# Patient Record
Sex: MALE | Race: BLACK OR AFRICAN AMERICAN
[De-identification: names, ages, dates, MRNs, and addresses within clinical notes are randomized per-mention and may not be internally consistent; named-entity substitution may affect disease eponyms.]

---

## 2019-08-11 ENCOUNTER — HOSPITAL ENCOUNTER (EMERGENCY)
Dept: HOSPITAL 74 - JER | Age: 34
Discharge: HOME | End: 2019-08-11
Payer: COMMERCIAL

## 2019-08-11 VITALS — HEART RATE: 78 BPM | DIASTOLIC BLOOD PRESSURE: 78 MMHG | SYSTOLIC BLOOD PRESSURE: 127 MMHG

## 2019-08-11 VITALS — TEMPERATURE: 99.6 F

## 2019-08-11 VITALS — BODY MASS INDEX: 27.7 KG/M2

## 2019-08-11 DIAGNOSIS — J36: Primary | ICD-10-CM

## 2019-08-11 LAB
ALBUMIN SERPL-MCNC: 4.1 G/DL (ref 3.4–5)
ALP SERPL-CCNC: 82 U/L (ref 45–117)
ALT SERPL-CCNC: 22 U/L (ref 13–61)
ANION GAP SERPL CALC-SCNC: 6 MMOL/L (ref 8–16)
AST SERPL-CCNC: 17 U/L (ref 15–37)
BASOPHILS # BLD: 0.5 % (ref 0–2)
BILIRUB SERPL-MCNC: 0.7 MG/DL (ref 0.2–1)
BUN SERPL-MCNC: 16.5 MG/DL (ref 7–18)
CALCIUM SERPL-MCNC: 9.3 MG/DL (ref 8.5–10.1)
CHLORIDE SERPL-SCNC: 99 MMOL/L (ref 98–107)
CO2 SERPL-SCNC: 31 MMOL/L (ref 21–32)
CREAT SERPL-MCNC: 1.2 MG/DL (ref 0.55–1.3)
DEPRECATED RDW RBC AUTO: 11.9 % (ref 11.9–15.9)
EOSINOPHIL # BLD: 0.7 % (ref 0–4.5)
GLUCOSE SERPL-MCNC: 94 MG/DL (ref 74–106)
HCT VFR BLD CALC: 43.2 % (ref 35.4–49)
HGB BLD-MCNC: 13.9 GM/DL (ref 11.7–16.9)
INR BLD: 1.16 (ref 0.83–1.09)
LYMPHOCYTES # BLD: 12 % (ref 8–40)
MCH RBC QN AUTO: 29.6 PG (ref 25.7–33.7)
MCHC RBC AUTO-ENTMCNC: 32.2 G/DL (ref 32–35.9)
MCV RBC: 91.9 FL (ref 80–96)
MONOCYTES # BLD AUTO: 9.5 % (ref 3.8–10.2)
NEUTROPHILS # BLD: 77.3 % (ref 42.8–82.8)
PLATELET # BLD AUTO: 302 K/MM3 (ref 134–434)
PMV BLD: 7.9 FL (ref 7.5–11.1)
POTASSIUM SERPLBLD-SCNC: 4.1 MMOL/L (ref 3.5–5.1)
PROT SERPL-MCNC: 8.6 G/DL (ref 6.4–8.2)
PT PNL PPP: 13.7 SEC (ref 9.7–13)
RBC # BLD AUTO: 4.7 M/MM3 (ref 4–5.6)
SODIUM SERPL-SCNC: 136 MMOL/L (ref 136–145)
WBC # BLD AUTO: 14.8 K/MM3 (ref 4–10)

## 2019-08-11 PROCEDURE — 0C9P0ZZ DRAINAGE OF TONSILS, OPEN APPROACH: ICD-10-PCS

## 2019-08-11 PROCEDURE — 3E0337Z INTRODUCTION OF ELECTROLYTIC AND WATER BALANCE SUBSTANCE INTO PERIPHERAL VEIN, PERCUTANEOUS APPROACH: ICD-10-PCS

## 2019-08-11 PROCEDURE — 3E03329 INTRODUCTION OF OTHER ANTI-INFECTIVE INTO PERIPHERAL VEIN, PERCUTANEOUS APPROACH: ICD-10-PCS

## 2019-08-11 PROCEDURE — 3E0333Z INTRODUCTION OF ANTI-INFLAMMATORY INTO PERIPHERAL VEIN, PERCUTANEOUS APPROACH: ICD-10-PCS

## 2019-08-11 PROCEDURE — 3E033NZ INTRODUCTION OF ANALGESICS, HYPNOTICS, SEDATIVES INTO PERIPHERAL VEIN, PERCUTANEOUS APPROACH: ICD-10-PCS

## 2019-08-11 NOTE — PDOC
History of Present Illness





- General


History Source: Patient





- History of Present Illness


Timing/Duration: reports: other





<Mandy ThackerAretha - Last Filed: 08/11/19 15:19>





<Dejah Mccoy - Last Filed: 08/11/19 16:46>





- General


Chief Complaint: Sore Throat


Stated Complaint: SORE THROAT


Time Seen by Provider: 08/11/19 13:21





Past History





- Past Medical History


Cancer: No


Cardiac Disorders: No


CVA: No


COPD: No





- Suicide/Smoking/Psychosocial Hx


Smoking Status: No


Smoking History: Never smoked


Have you smoked in the past 12 months: No


Number of Cigarettes Smoked Daily: 0


Hx Alcohol Use: No


Drug/Substance Use Hx: No


Substance Use Type: None





<Gini ThackerJustin - Last Filed: 08/11/19 15:19>





<Dejah Mccoy - Last Filed: 08/11/19 16:46>





- Past Medical History


Allergies/Adverse Reactions: 


 Allergies











Allergy/AdvReac Type Severity Reaction Status Date / Time


 


No Known Allergies Allergy   Verified 08/11/19 13:48











Home Medications: 


Ambulatory Orders





Amoxicillin - [Amoxicillin 500mg Capsule -] 875 mg PO BID 08/11/19 


Amoxicillin/Potassium Clav [Augmentin 875-125 Tablet] 1 each PO BID #14 tablet 

08/11/19 


Ibuprofen Oral Suspension [Motrin Oral Suspension -] 800 mg PO Q6H #1 ml 08/11/ 19 


Methylprednisolone [Medrol Dose Steve] 4 mg PO ASDIR #21 tablet 08/11/19 











**Review of Systems





- Review of Systems


Constitutional: No: Chills, Fever


HEENTM: Yes: Throat Pain





<WoodyPam Last Filed: 08/11/19 15:19>





*Physical Exam





- Vital Signs


 Last Vital Signs











Temp Pulse Resp BP Pulse Ox


 


 99.6 F   81   20   127/81   100 


 


 08/11/19 13:06  08/11/19 13:06  08/11/19 13:06  08/11/19 13:06  08/11/19 13:06














- Physical Exam


General Appearance: Yes: Appropriately Dressed, Moderate Distress


HEENT: positive: Muffled/Hoarse voice, Other (fullness to R posterior pharynx w

/ L uvula deviation c/w PTA)


Neck: positive: Supple.  negative: Stridor, Lymphadenopathy (R), 

Lymphadenopathy (L)


Respiratory/Chest: negative: Respiratory Distress


Integumentary: positive: Dry, Warm


Neurologic: positive: Fully Oriented, Alert, Normal Mood/Affect





<KirstiePam - Last Filed: 08/11/19 15:19>





- Vital Signs


 Last Vital Signs











Temp Pulse Resp BP Pulse Ox


 


 99.6 F   78   18   127/78   98 


 


 08/11/19 13:06  08/11/19 14:56  08/11/19 14:56  08/11/19 14:56  08/11/19 14:56














<Dejah Mccoy - Last Filed: 08/11/19 16:46>





Procedures





- Additional Procedures


Additional Procedures: other


Progress: 





Bedside ultrasound was done to confirm the PTA, and was positive for small ~1cm 

collection. Patient was anesthetized with local lidocaine 1%, then attempted to 

aspirate the fluctuant area with 18G needle, inserted to maximum of 2cm. 

Attempted x2, unsuccessful in getting return of purulent material. Patient 

tolerated well, minimal blood loss. 








<Dejah Mccoy - Last Filed: 08/11/19 16:46>





ED Treatment Course





- LABORATORY


CBC & Chemistry Diagram: 


 08/11/19 13:30





 08/11/19 13:30





- Medications


Given in the ED: 


ED Medications














Discontinued Medications














Generic Name Dose Route Start Last Admin





  Trade Name Ubaldo  PRN Reason Stop Dose Admin


 


Dexamethasone Sodium Phosphate  10 mg  08/11/19 13:28  08/11/19 13:41





  Decadron Injection -  IVPUSH  08/11/19 13:29  10 mg





  ONCE ONE   Administration





     





     





     





     


 


Ketorolac Tromethamine  30 mg  08/11/19 13:27  08/11/19 13:41





  Toradol Injection -  IVPUSH  08/11/19 13:28  30 mg





  ONCE ONE   Administration





     





     





     





     














<KirstieMandyJoseNyla - Last Filed: 08/11/19 15:19>





- LABORATORY


CBC & Chemistry Diagram: 


 08/11/19 13:30





 08/11/19 13:30





- ADDITIONAL ORDERS


Additional order review: 


 Laboratory  Results











  08/11/19 08/11/19 08/11/19





  13:30 13:30 13:30


 


PT with INR   13.70 H 


 


INR   1.16 H 


 


Sodium    136


 


Potassium    4.1


 


Chloride    99


 


Carbon Dioxide    31


 


Anion Gap    6 L


 


BUN    16.5


 


Creatinine    1.2


 


Est GFR (CKD-EPI)AfAm    90.89


 


Est GFR (CKD-EPI)NonAf    78.42


 


Random Glucose    94


 


Calcium    9.3


 


Total Bilirubin    0.7


 


AST    17


 


ALT    22


 


Alkaline Phosphatase    82


 


Total Protein    8.6 H


 


Albumin    4.1


 


Blood Type  B POSITIVE  


 


Antibody Screen  Negative  








 











  08/11/19





  13:30


 


RBC  4.70


 


MCV  91.9


 


MCHC  32.2


 


RDW  11.9


 


MPV  7.9


 


Neutrophils %  77.3


 


Lymphocytes %  12.0  D


 


Monocytes %  9.5


 


Eosinophils %  0.7  D


 


Basophils %  0.5














- Medications


Given in the ED: 


ED Medications














Discontinued Medications














Generic Name Dose Route Start Last Admin





  Trade Name Freq  PRN Reason Stop Dose Admin


 


Dexamethasone Sodium Phosphate  10 mg  08/11/19 13:28  08/11/19 13:41





  Decadron Injection -  IVPUSH  08/11/19 13:29  10 mg





  ONCE ONE   Administration





     





     





     





     


 


Ampicillin Sodium/Sulbactam  100 mls @ 200 mls/hr  08/11/19 13:27  08/11/19 13:

47





  Sodium 1.5 gm/ Sodium Chloride  IVPB  08/11/19 13:56  200 mls/hr





  ONCE ONE   Administration





     





     





     





     


 


Sodium Chloride  1,000 mls @ 1,000 mls/hr  08/11/19 13:28  08/11/19 13:41





  Normal Saline -  IV  08/11/19 14:27  1,000 mls/hr





  ASDIR STA   Administration





     





     





     





     


 


Ketorolac Tromethamine  30 mg  08/11/19 13:27  08/11/19 13:41





  Toradol Injection -  IVPUSH  08/11/19 13:28  30 mg





  ONCE ONE   Administration





     





     





     





     














<Dejah Mccoy - Last Filed: 08/11/19 16:46>





Medical Decision Making





- Medical Decision Making





08/11/19 14:21





34-year-old male, history of recurrent strep, PTA, here with sore throat w/ 

muffled voice, similar to prior PTA per pt, x 5 days. Also reports HA and 

fatigue. States he was seen at [facility] for same. On initial visit, had neg 

strep test and dc w/ motrin. On 2nd visit, given amox. Has since taken 2 doses. 

No improvement in sxs. Denies f/c





See exam





PTA


No airway compromise


Stable


-unasyn


-pain control


-steroids


-IVF


-Pt s/p bedside US by Dr Joanne alexandra/ adriana coto, ED staff to attempt I&D








08/11/19 15:08


Unsuccessful I&D here, will place consult to ENT at this time








08/11/19 15:19


Case d/w Dr Rosenbaum of ENT, states he can perform I&D in office in am. Aware 

of labs. Rec pt start on medrol dosepack and augmentin. Pt reports feeling sig 

better w/ meds. Tolerating po and handling own secretions. Feels well enough to 

go home and f/u with ENT in am. ED attg aware of dispo











<Pam Thacker - Last Filed: 08/11/19 15:19>





*DC/Admit/Observation/Transfer





<Pam Thacker - Last Filed: 08/11/19 15:19>





<Dejah Mccoy - Last Filed: 08/11/19 16:46>


Diagnosis at time of Disposition: 


 Peritonsillar abscess








- Discharge Dispostion


Disposition: HOME


Condition at time of disposition: Improved





- Prescriptions


Prescriptions: 


Amoxicillin/Potassium Clav [Augmentin 875-125 Tablet] 1 each PO BID #14 tablet


Ibuprofen Oral Suspension [Motrin Oral Suspension -] 800 mg PO Q6H #1 ml


Methylprednisolone [Medrol Dose Steve] 4 mg PO ASDIR #21 tablet





- Referrals


Referrals: 


Meri Randall MD [Primary Care Provider] - 





- Patient Instructions


Printed Discharge Instructions:  DI for Peritonsillar Abscess -- Adult


Additional Instructions: 


You have a peritonsillar abscess today that we were unable to drain in the 

emergency room.  


We spoke to ENT, Dr. Rosenbaum, who wants to see you in the office tomorrow to 

drain abscess. 


He wants you to call his  at [phone], tomorrow after 8am to 

schedule appointment for tomorrow.  If  informs you that Dr. Rosenbaum is booked, please inform her that our ER had contacted him and he 

wants clinic to overbook you as his last patient


If they have any question, have them call TROY Thacker at  anytime 

after 8am tomorrow


Please take medications as directed





- Post Discharge Activity


Forms/Work/School Notes:  Back to Work

## 2022-06-30 ENCOUNTER — HOSPITAL ENCOUNTER (EMERGENCY)
Dept: HOSPITAL 74 - JERFT | Age: 37
Discharge: HOME | End: 2022-06-30
Payer: COMMERCIAL

## 2022-06-30 VITALS — SYSTOLIC BLOOD PRESSURE: 109 MMHG | HEART RATE: 88 BPM | TEMPERATURE: 98.4 F | DIASTOLIC BLOOD PRESSURE: 67 MMHG

## 2022-06-30 VITALS — BODY MASS INDEX: 3.9 KG/M2
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
## 2022-06-30 DIAGNOSIS — S93.401A: Primary | ICD-10-CM

## 2022-08-18 ENCOUNTER — HOSPITAL ENCOUNTER (EMERGENCY)
Dept: HOSPITAL 74 - JER | Age: 37
Discharge: HOME | End: 2022-08-18
Payer: COMMERCIAL

## 2022-08-18 VITALS
SYSTOLIC BLOOD PRESSURE: 135 MMHG | TEMPERATURE: 98.3 F | RESPIRATION RATE: 18 BRPM | HEART RATE: 59 BPM | DIASTOLIC BLOOD PRESSURE: 88 MMHG

## 2022-08-18 VITALS — BODY MASS INDEX: 39.9 KG/M2

## 2022-08-18 DIAGNOSIS — K08.89: Primary | ICD-10-CM

## 2023-01-11 ENCOUNTER — HOSPITAL ENCOUNTER (EMERGENCY)
Dept: HOSPITAL 74 - JERFT | Age: 38
Discharge: HOME | End: 2023-01-11
Payer: COMMERCIAL

## 2023-01-11 VITALS
TEMPERATURE: 98.2 F | HEART RATE: 86 BPM | RESPIRATION RATE: 18 BRPM | SYSTOLIC BLOOD PRESSURE: 128 MMHG | DIASTOLIC BLOOD PRESSURE: 73 MMHG

## 2023-01-11 VITALS — BODY MASS INDEX: 28.4 KG/M2

## 2023-01-11 DIAGNOSIS — J03.90: Primary | ICD-10-CM

## 2023-01-11 LAB — S PYO DNA THROAT QL NAA+PROBE: NOT DETECTED

## 2023-07-05 ENCOUNTER — HOSPITAL ENCOUNTER (EMERGENCY)
Dept: HOSPITAL 74 - JERFT | Age: 38
Discharge: HOME | End: 2023-07-05
Payer: COMMERCIAL

## 2023-07-05 VITALS
RESPIRATION RATE: 16 BRPM | TEMPERATURE: 99 F | SYSTOLIC BLOOD PRESSURE: 122 MMHG | HEART RATE: 103 BPM | DIASTOLIC BLOOD PRESSURE: 80 MMHG

## 2023-07-05 VITALS — BODY MASS INDEX: 29.6 KG/M2

## 2023-07-05 DIAGNOSIS — R13.10: ICD-10-CM

## 2023-07-05 DIAGNOSIS — J02.0: ICD-10-CM

## 2023-07-05 DIAGNOSIS — R07.0: Primary | ICD-10-CM
